# Patient Record
Sex: FEMALE | Race: WHITE | NOT HISPANIC OR LATINO | Employment: OTHER | URBAN - METROPOLITAN AREA
[De-identification: names, ages, dates, MRNs, and addresses within clinical notes are randomized per-mention and may not be internally consistent; named-entity substitution may affect disease eponyms.]

---

## 2021-05-12 ENCOUNTER — PATIENT MESSAGE (OUTPATIENT)
Dept: RESEARCH | Facility: HOSPITAL | Age: 81
End: 2021-05-12
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
## 2022-01-26 PROBLEM — S80.00XA CONTUSION OF KNEE: Status: ACTIVE | Noted: 2022-01-26

## 2022-01-26 PROBLEM — K21.00 GASTROESOPHAGEAL REFLUX DISEASE WITH ESOPHAGITIS WITHOUT HEMORRHAGE: Status: ACTIVE | Noted: 2022-01-26

## 2022-01-26 PROBLEM — Z96.653 HISTORY OF TOTAL BILATERAL KNEE REPLACEMENT: Status: ACTIVE | Noted: 2022-01-26

## 2022-01-26 PROBLEM — M25.561 ACUTE PAIN OF BOTH KNEES: Status: ACTIVE | Noted: 2022-01-26

## 2022-01-26 PROBLEM — M25.562 ACUTE PAIN OF BOTH KNEES: Status: ACTIVE | Noted: 2022-01-26

## 2022-01-26 PROBLEM — Z95.5 PRESENCE OF STENT IN CORONARY ARTERY: Status: ACTIVE | Noted: 2020-12-03

## 2022-01-26 PROBLEM — Z95.820 STATUS POST ANGIOPLASTY WITH STENT: Status: ACTIVE | Noted: 2020-12-03

## 2022-01-26 PROBLEM — E78.00 HIGH CHOLESTEROL: Status: ACTIVE | Noted: 2022-01-26

## 2022-01-26 PROBLEM — I10 HYPERTENSION, ESSENTIAL: Chronic | Status: ACTIVE | Noted: 2022-01-26

## 2024-04-16 ENCOUNTER — TELEPHONE (OUTPATIENT)
Dept: CARDIOLOGY | Facility: CLINIC | Age: 84
End: 2024-04-16
Payer: MEDICARE

## 2024-04-16 DIAGNOSIS — I34.2 NON-RHEUMATIC MITRAL VALVE STENOSIS: Primary | ICD-10-CM

## 2024-04-16 NOTE — TELEPHONE ENCOUNTER
Request for evaluation by Dr Contreras for angiogram. Spoke with nurse staff for clarification as patient has Mitral valve stenosis, Dr Cheng request Dr Contreras to evaluate patient for possible blockages prior to additional possible valve needs in the future pt would be then referred to Dr Chiu or Dr Angel.  Appointment made as requested.

## 2024-04-17 ENCOUNTER — TELEPHONE (OUTPATIENT)
Dept: CARDIOLOGY | Facility: CLINIC | Age: 84
End: 2024-04-17
Payer: MEDICARE

## 2024-04-17 NOTE — TELEPHONE ENCOUNTER
Contact with patient, offered virtrual appointment due to long distance travel.  Pt declined.  Pt bringing CD of angio done 3 years ago.  Pt continues to attempt to get her ECHO preformed with Dr Worthy in [place] but was told she could have the report but not the imaging. Pt is scheduled for ECHO at [facility] on day of her clinic appointment.

## 2024-04-25 ENCOUNTER — HOSPITAL ENCOUNTER (OUTPATIENT)
Dept: CARDIOLOGY | Facility: HOSPITAL | Age: 84
Discharge: HOME | End: 2024-04-25
Attending: INTERNAL MEDICINE
Payer: MEDICARE

## 2024-04-25 ENCOUNTER — EDUCATION (OUTPATIENT)
Dept: CARDIOLOGY | Facility: CLINIC | Age: 84
End: 2024-04-25
Payer: MEDICARE

## 2024-04-25 ENCOUNTER — OFFICE VISIT (OUTPATIENT)
Dept: CARDIOLOGY | Facility: CLINIC | Age: 84
End: 2024-04-25
Payer: MEDICARE

## 2024-04-25 VITALS
HEIGHT: 61 IN | HEART RATE: 71 BPM | WEIGHT: 147.06 LBS | BODY MASS INDEX: 27.77 KG/M2 | DIASTOLIC BLOOD PRESSURE: 84 MMHG | SYSTOLIC BLOOD PRESSURE: 187 MMHG | OXYGEN SATURATION: 100 %

## 2024-04-25 VITALS
DIASTOLIC BLOOD PRESSURE: 94 MMHG | BODY MASS INDEX: 39.01 KG/M2 | SYSTOLIC BLOOD PRESSURE: 157 MMHG | HEIGHT: 62 IN | HEART RATE: 70 BPM | WEIGHT: 212 LBS

## 2024-04-25 DIAGNOSIS — I10 HYPERTENSION, ESSENTIAL: Chronic | ICD-10-CM

## 2024-04-25 DIAGNOSIS — E66.01 SEVERE OBESITY (BMI 35.0-39.9) WITH COMORBIDITY: Primary | ICD-10-CM

## 2024-04-25 DIAGNOSIS — I34.2 NON-RHEUMATIC MITRAL VALVE STENOSIS: Primary | ICD-10-CM

## 2024-04-25 DIAGNOSIS — Z95.820 STATUS POST ANGIOPLASTY WITH STENT: ICD-10-CM

## 2024-04-25 DIAGNOSIS — I34.2 NON-RHEUMATIC MITRAL VALVE STENOSIS: ICD-10-CM

## 2024-04-25 DIAGNOSIS — I25.118 CORONARY ARTERY DISEASE OF NATIVE ARTERY OF NATIVE HEART WITH STABLE ANGINA PECTORIS: ICD-10-CM

## 2024-04-25 LAB
ASCENDING AORTA: 2.84 CM
AV INDEX (PROSTH): 0.61
AV MEAN GRADIENT: 7 MMHG
AV PEAK GRADIENT: 14 MMHG
AV VALVE AREA BY VELOCITY RATIO: 1.61 CM²
AV VALVE AREA: 1.74 CM²
AV VELOCITY RATIO: 0.57
BSA FOR ECHO PROCEDURE: 2.05 M2
CV ECHO LV RWT: 0.62 CM
DOP CALC AO PEAK VEL: 1.9 M/S
DOP CALC AO VTI: 44.27 CM
DOP CALC LVOT AREA: 2.8 CM2
DOP CALC LVOT DIAMETER: 1.9 CM
DOP CALC LVOT PEAK VEL: 1.08 M/S
DOP CALC LVOT STROKE VOLUME: 76.85 CM3
DOP CALC MV VTI: 83.18 CM
DOP CALCLVOT PEAK VEL VTI: 27.12 CM
E WAVE DECELERATION TIME: 359.24 MSEC
E/A RATIO: 1.63
E/E' RATIO: 42 M/S
ECHO LV POSTERIOR WALL: 1.21 CM (ref 0.6–1.1)
FRACTIONAL SHORTENING: 38 % (ref 28–44)
HR MV ECHO: 63 BPM
INTERVENTRICULAR SEPTUM: 0.91 CM (ref 0.6–1.1)
IVRT: 85.63 MSEC
LA MAJOR: 5.61 CM
LA MINOR: 5.51 CM
LA WIDTH: 4.13 CM
LEFT ATRIUM SIZE: 4.09 CM
LEFT ATRIUM VOLUME INDEX MOD: 37.9 ML/M2
LEFT ATRIUM VOLUME INDEX: 40.7 ML/M2
LEFT ATRIUM VOLUME MOD: 74.35 CM3
LEFT ATRIUM VOLUME: 79.82 CM3
LEFT INTERNAL DIMENSION IN SYSTOLE: 2.43 CM (ref 2.1–4)
LEFT VENTRICLE DIASTOLIC VOLUME INDEX: 33.54 ML/M2
LEFT VENTRICLE DIASTOLIC VOLUME: 65.74 ML
LEFT VENTRICLE MASS INDEX: 68 G/M2
LEFT VENTRICLE SYSTOLIC VOLUME INDEX: 10.7 ML/M2
LEFT VENTRICLE SYSTOLIC VOLUME: 20.88 ML
LEFT VENTRICULAR INTERNAL DIMENSION IN DIASTOLE: 3.9 CM (ref 3.5–6)
LEFT VENTRICULAR MASS: 132.76 G
LV LATERAL E/E' RATIO: 42 M/S
LV SEPTAL E/E' RATIO: 42 M/S
MV A" WAVE DURATION": 15.13 MSEC
MV MEAN GRADIENT: 11 MMHG
MV PEAK A VEL: 1.03 M/S
MV PEAK E VEL: 1.68 M/S
MV PEAK GRADIENT: 25 MMHG
MV STENOSIS PRESSURE HALF TIME: 104.18 MS
MV VALVE AREA BY CONTINUITY EQUATION: 0.92 CM2
MV VALVE AREA P 1/2 METHOD: 2.11 CM2
OHS CV RV/LV RATIO: 0.76 CM
PISA MRMAX VEL: 0.06 M/S
PISA TR MAX VEL: 3.22 M/S
PULM VEIN S/D RATIO: 1.63
PV PEAK D VEL: 0.24 M/S
PV PEAK S VEL: 0.39 M/S
RA MAJOR: 4.91 CM
RA PRESSURE ESTIMATED: 3 MMHG
RA WIDTH: 3.23 CM
RIGHT VENTRICULAR END-DIASTOLIC DIMENSION: 2.96 CM
RV TB RVSP: 6 MMHG
SINUS: 2.74 CM
STJ: 2.36 CM
TDI LATERAL: 0.04 M/S
TDI SEPTAL: 0.04 M/S
TDI: 0.04 M/S
TR MAX PG: 41 MMHG
TRICUSPID ANNULAR PLANE SYSTOLIC EXCURSION: 1.84 CM
TV REST PULMONARY ARTERY PRESSURE: 44 MMHG
Z-SCORE OF LEFT VENTRICULAR DIMENSION IN END DIASTOLE: -3.64
Z-SCORE OF LEFT VENTRICULAR DIMENSION IN END SYSTOLE: -2.77

## 2024-04-25 PROCEDURE — 99999 PR PBB SHADOW E&M-EST. PATIENT-LVL III: CPT | Mod: PBBFAC,,, | Performed by: INTERNAL MEDICINE

## 2024-04-25 PROCEDURE — 99204 OFFICE O/P NEW MOD 45 MIN: CPT | Mod: S$PBB,,, | Performed by: INTERNAL MEDICINE

## 2024-04-25 PROCEDURE — 93306 TTE W/DOPPLER COMPLETE: CPT

## 2024-04-25 PROCEDURE — 99213 OFFICE O/P EST LOW 20 MIN: CPT | Mod: PBBFAC,25 | Performed by: INTERNAL MEDICINE

## 2024-04-25 PROCEDURE — 93306 TTE W/DOPPLER COMPLETE: CPT | Mod: 26,,, | Performed by: INTERNAL MEDICINE

## 2024-04-25 RX ORDER — METOPROLOL TARTRATE 25 MG/1
25 TABLET, FILM COATED ORAL 2 TIMES DAILY
Qty: 60 TABLET | Refills: 3 | Status: SHIPPED | OUTPATIENT
Start: 2024-04-25 | End: 2025-04-25

## 2024-04-25 RX ORDER — IBUPROFEN 600 MG/1
TABLET ORAL
COMMUNITY
Start: 2024-01-01

## 2024-04-25 NOTE — PROGRESS NOTES
If you need to change the date of your procedure, please call  Juani SNELL [phone]  CALL THE OFFICE IF YOU HAVE ANY MEDICATIONS CHANGES BEFORE A PROCEDURE.     OUTPATIENT CATHETERIZATION INSTRUCTIONS - NEW PROCEDURE DATE AND ARRIVAL TIME.     You have been scheduled for a procedure in the catheterization lab on  WEDNESDAY, MAY 13, 2024     Please report to the Cardiology Waiting Area on the Third floor of the hospital and check in at ARRIVAL TIME at 0600.   You will then be taken to the SSCU (Short Stay Cardiac Unit) and prepared for your procedure. Please be aware that this is not the time of your procedure but the time you are to arrive. The procedures are scheduled on an hourly basis; however, emergency cases take precedence over all other cases.         1. NOTHING TO EAT AFTER MIDNIGHT 12AM the morning of the procedure.  You may take your medications with small sips of water. SEE BELOW INSTRUCTIONS ON MEDICATIONS.     2.  Continue with daily medications, including the morning of the procedure except for those instructed to stop or hold (diurectic).  Do not stop your Aspirin.  SEE BELOW.  Start new medicine, and take daily everyday.    3.  Diabetics:  IF TAKING OZEMPIC- THIS MUST BE HELD FOR ONE WEEK PRIOR TO PROCEDURE.    4. Heart Failure Patients:N/A        The procedure will take 1-2 hours to perform. After the procedure, you will return to SSCU on the third floor of the hospital. You will need to lie still (or keep your arm still) for the next 4 to 6 hours to minimize bleeding from the puncture site. Your family may remain in the room with you during this time.         You may be able to be discharged home that same afternoon if there is someone to drive you home and there were no complications. If you have one of the balloon, stent, or device procedures you may spend the night in the hospital. Your doctor will determine, based on your progress, the date and time of your discharge. The results of your  procedure will be discussed with you before you are discharged. Any further testing or procedures will be scheduled for you either before you leave or you will be called with these appointments.   YOU WILL NOT BE ABLE TO DRIVE HOME  THE DAY OF THE PROCEDURE.      If you should have any questions, concerns, or please call Juani [phone]        Special Instructions:  Drink plenty of water the day before the procedure and the day after the procedure to flush your kidneys.     Continue daily medications, including aspirin the morning of the procedure.  See Special instructions for other medications.     IMPORTANT:  HOLD The following medications as directed:    HOLD FLUID PILLS - MICROZIDE do not take morning of the procedure, but bring with you to hospital and make take it after the procedure.      DO NOT STOP ASPIRIN.  Take the morning of the procedure.             THE ABOVE INSTRUCTIONS WERE GIVEN TO THE PATIENT VERBALLY AND THEY VERBALIZED UNDERSTANDING.  THEY DO NOT REQUIRE ANY SPECIAL NEEDS AND DO NOT HAVE ANY LEARNING BARRIERS.          Directions for Reporting to Cardiology Waiting Area in the Hospital  If you park in the Parking Garage:  Take elevators to the1st floor of the parking garage.  Continue past the gift shop, coffee shop, and piano.  Take a right and go to the gold elevators. (Elevator B)  Take the elevator to the 3rd floor.  Follow the arrow on the sign on the wall that says Cath Lab Registration/EP Lab Registration.  Follow the long hallway all the way around until you come to a big open area.  This is the registration area.  Check in at Reception Desk.     OR     If family is dropping you off:  Have them drop you off at the front of the Hospital under the green overhang.  Enter through the doors and take a right.  Take the 'E' elevators to the 3rd floor Cardiology Waiting Area.  Check in at the Reception Desk in the waiting room.

## 2024-04-26 DIAGNOSIS — I25.10 ATHEROSCLEROSIS OF CORONARY ARTERY, UNSPECIFIED VESSEL OR LESION TYPE, UNSPECIFIED WHETHER ANGINA PRESENT, UNSPECIFIED WHETHER NATIVE OR TRANSPLANTED HEART: Primary | ICD-10-CM

## 2024-04-26 DIAGNOSIS — I25.10 CAD (CORONARY ARTERY DISEASE): ICD-10-CM

## 2024-04-26 RX ORDER — SODIUM CHLORIDE 9 MG/ML
INJECTION, SOLUTION INTRAVENOUS CONTINUOUS
Status: CANCELLED | OUTPATIENT
Start: 2024-04-26 | End: 2024-04-26

## 2024-04-26 RX ORDER — DIPHENHYDRAMINE HCL 50 MG
50 CAPSULE ORAL ONCE
Status: CANCELLED | OUTPATIENT
Start: 2024-04-26 | End: 2024-04-26

## 2024-04-27 PROBLEM — I25.118 CORONARY ARTERY DISEASE OF NATIVE ARTERY OF NATIVE HEART WITH STABLE ANGINA PECTORIS: Status: ACTIVE | Noted: 2024-04-27

## 2024-04-27 NOTE — PROGRESS NOTES
"Subjective:    Patient ID:  Gudelia Long is a 84 y.o. female who presents for evaluation of Coronary Artery Disease and Shortness of Breath      Referring physician: Michael Gaines     HPI  Mrs. Long is an 83 y/o woman with CAD and h/o coronary stents. She has THN and HLD. She also has scleroderma diagnosed 30 years ago.  She reports SOB X 6 months that has been progressive.  She reports similar symptoms prior to her coronary stent placement in 2020.  She denies LE edema, orthopnea, or PND.  She denies palpitations, syncope, or near syncope. However she reports 1 dizzy spell while walking several weeks ago that was transient.      Past Medical History:   Diagnosis Date    Contusion of RT & LT Knees Onset 18 January 2022 1/26/2022    Fall Onto Knees 18 January 2022 1/26/2022    Gastroesophageal reflux disease with esophagitis without hemorrhage 1/26/2022    High cholesterol 1/26/2022    History Total Knee Replacement:  LT TKR 2013 & RT TKR 04 January 2018 1/26/2022    Hypertension     Hypertension, essential 1/26/2022    RT & LT Knee Pain Onset 18 January 2022 1/26/2022    Status post Cardiac Angioplasty with Stent 03 Dec 2020 12/3/2020       Past Surgical History:   Procedure Laterality Date    CATARACT EXTRACTION W/ INTRAOCULAR LENS IMPLANT Bilateral 1992    [place], [place]    CORONARY ANGIOPLASTY WITH STENT PLACEMENT  12/03/2020    Dr Arm[facility]    HYSTERECTOMY  1996    [place], [place]    TOTAL KNEE ARTHROPLASTY Left 2013    [facility]    TOTAL KNEE ARTHROPLASTY Right 01/04/2018    RT TKR. " It never worked right." [facility]       Current Outpatient Medications on File Prior to Visit   Medication Sig Dispense Refill    amLODIPine (NORVASC) 5 MG tablet Take 5 mg by mouth once daily.      aspirin (ECOTRIN) 81 MG EC tablet Take 81 mg by mouth.      hydroCHLOROthiazide " (MICROZIDE) 12.5 mg capsule Take 12.5 mg by mouth.      ibuprofen (ADVIL,MOTRIN) 600 MG tablet       losartan (COZAAR) 100 MG tablet Take 100 mg by mouth once daily.      omeprazole (PRILOSEC) 40 MG capsule       atorvastatin (LIPITOR) 40 MG tablet Take 40 mg by mouth. (Patient not taking: Reported on 4/25/2024)       No current facility-administered medications on file prior to visit.       Review of patient's allergies indicates:  No Known Allergies    Social History     Tobacco Use    Smoking status: Never    Smokeless tobacco: Never   Substance Use Topics    Alcohol use: Never    Drug use: Never       No family history on file.      Review of Systems   Constitutional: Negative for diaphoresis, fever, malaise/fatigue and weight gain.   HENT:  Negative for congestion, hearing loss, nosebleeds and sore throat.    Eyes:  Negative for visual disturbance.   Cardiovascular:  Positive for dyspnea on exertion. Negative for chest pain, claudication, irregular heartbeat, leg swelling, near-syncope, orthopnea, palpitations, paroxysmal nocturnal dyspnea and syncope.   Respiratory:  Negative for cough, hemoptysis, shortness of breath and wheezing.    Endocrine: Negative for polyuria.   Hematologic/Lymphatic: Negative for bleeding problem. Does not bruise/bleed easily.   Skin:  Negative for poor wound healing and rash.   Musculoskeletal:  Negative for myalgias.   Gastrointestinal:  Negative for abdominal pain, change in bowel habit, constipation, diarrhea, dysphagia, heartburn, hematemesis, melena, nausea and vomiting.   Genitourinary:  Negative for bladder incontinence and dysuria.   Neurological:  Negative for focal weakness, headaches and weakness.   Psychiatric/Behavioral:  Negative for depression.    Allergic/Immunologic: Negative for hives and persistent infections.        Objective:     Vitals:    04/25/24 0925 04/25/24 0926   BP: (!) 202/91 (!) 187/84   BP Location: Left arm Right arm   Patient Position: Sitting  "Sitting   BP Method: Large (Automatic) Large (Automatic)   Pulse: 70 71   SpO2: 100% 100%   Weight: 66.7 kg (147 lb 0.8 oz)    Height: 5' 1" (1.549 m)         Physical Exam  Constitutional:       Appearance: She is well-developed. She is not diaphoretic.   HENT:      Head: Normocephalic and atraumatic.   Eyes:      General: No scleral icterus.        Right eye: No discharge.      Extraocular Movements: Extraocular movements intact.   Neck:      Thyroid: No thyromegaly.      Vascular: No JVD.   Cardiovascular:      Rate and Rhythm: Normal rate and regular rhythm.      Chest Wall: PMI is not displaced.      Pulses:           Carotid pulses are 2+ on the right side and 2+ on the left side.       Radial pulses are 2+ on the right side and 2+ on the left side.        Femoral pulses are 2+ on the right side and 2+ on the left side.       Dorsalis pedis pulses are 2+ on the right side and 2+ on the left side.        Posterior tibial pulses are 2+ on the right side and 2+ on the left side.      Heart sounds: No murmur heard.     No gallop.   Pulmonary:      Effort: Pulmonary effort is normal.      Breath sounds: Normal breath sounds.   Abdominal:      General: Bowel sounds are normal.      Palpations: Abdomen is soft.      Tenderness: There is no abdominal tenderness.   Lymphadenopathy:      Cervical: No cervical adenopathy.   Skin:     General: Skin is warm and dry.   Neurological:      Mental Status: She is alert.      Gait: Gait normal.   Psychiatric:         Mood and Affect: Mood normal.         Behavior: Behavior normal.           Assessment:       1. Severe obesity (BMI 35.0-39.9) with comorbidity    2. Hypertension, essential    3. Status post Cardiac Angioplasty with Stent 03 Dec 2020    4. Coronary artery disease of native artery of native heart with stable angina pectoris         Plan:       1) RIVERA.  The patient has CAD and her RIVERA was her anginal equivalent in the past; it has now recurred; I discussed medical " management plus cardiac catheterization and possible PCI with the patient vs medical management alone.  She opted for medical management plus cardiac catherization and possible PCI.  -continue EC ASA 81mg po q day  -start Plavix 75mg poq day  -start lopressor 25mg po BID  The risks, benefits, and alternatives of cardiac catheterization and possible intervention were discussed with the patient.  All questions were answered and informed consent was obtained.    2) HTN. The patient reports well controlled home blood pressure of 130's/80's and states she has white coat HTN; rec blood pressure log X 1 weeks  -continue losartan 100mg po qday  -continue Norvasc 5mg po q day    3) HLD. Continue Lipitor 40mgpo q day    4) Scleroderma. Rec close follow-up with patient's rheumatologist    All of the patient's and her family's questions were answered.

## 2024-04-29 ENCOUNTER — TELEPHONE (OUTPATIENT)
Dept: CARDIOLOGY | Facility: CLINIC | Age: 84
End: 2024-04-29
Payer: MEDICARE

## 2024-04-29 NOTE — TELEPHONE ENCOUNTER
Attempt to contact patient x 2, no answer, secondary number non working.  Left detailed message to call office back.  Provided office phone number and message for patient to start Plavix 75mg one tablet daily everyday in anticipation for the angiogram planned.

## 2024-04-29 NOTE — TELEPHONE ENCOUNTER
Pharmacy contact call - NEW RX -  verbal given to pharmacy - plavix 75mg one tablet once daily #30, 3 refills.  Pt to begin taking daily and including the morning of the procedure along with aspirin 81mg one tablet daily.  Notification to patient of Rx and treatment med plan added.

## 2024-05-01 ENCOUNTER — TELEPHONE (OUTPATIENT)
Dept: CARDIOLOGY | Facility: CLINIC | Age: 84
End: 2024-05-01
Payer: MEDICARE

## 2024-05-13 ENCOUNTER — TELEPHONE (OUTPATIENT)
Dept: CARDIAC REHAB | Facility: CLINIC | Age: 84
End: 2024-05-13
Payer: MEDICARE

## 2024-05-13 ENCOUNTER — HOSPITAL ENCOUNTER (OUTPATIENT)
Facility: HOSPITAL | Age: 84
Discharge: HOME | End: 2024-05-13
Attending: INTERNAL MEDICINE | Admitting: INTERNAL MEDICINE
Payer: MEDICARE

## 2024-05-13 VITALS
RESPIRATION RATE: 18 BRPM | OXYGEN SATURATION: 97 % | BODY MASS INDEX: 27.38 KG/M2 | HEIGHT: 61 IN | WEIGHT: 145 LBS | TEMPERATURE: 98 F | SYSTOLIC BLOOD PRESSURE: 147 MMHG | DIASTOLIC BLOOD PRESSURE: 70 MMHG | HEART RATE: 65 BPM

## 2024-05-13 DIAGNOSIS — Z95.820 S/P PERCUTANEOUS TRANSLUMINAL ANGIOPLASTY (PTA) WITH STENT PLACEMENT: Primary | ICD-10-CM

## 2024-05-13 DIAGNOSIS — I25.10 CAD (CORONARY ARTERY DISEASE): ICD-10-CM

## 2024-05-13 DIAGNOSIS — I25.10 ATHEROSCLEROSIS OF CORONARY ARTERY, UNSPECIFIED VESSEL OR LESION TYPE, UNSPECIFIED WHETHER ANGINA PRESENT, UNSPECIFIED WHETHER NATIVE OR TRANSPLANTED HEART: ICD-10-CM

## 2024-05-13 DIAGNOSIS — I25.10 CORONARY ARTERY DISEASE: ICD-10-CM

## 2024-05-13 LAB
ABO + RH BLD: NORMAL
ANION GAP SERPL CALC-SCNC: 10 MMOL/L (ref 8–16)
BASOPHILS # BLD AUTO: 0.03 K/UL (ref 0–0.2)
BASOPHILS NFR BLD: 0.5 % (ref 0–1.9)
BLD GP AB SCN CELLS X3 SERPL QL: NORMAL
BUN SERPL-MCNC: 29 MG/DL (ref 8–23)
CALCIUM SERPL-MCNC: 9.7 MG/DL (ref 8.7–10.5)
CHLORIDE SERPL-SCNC: 106 MMOL/L (ref 95–110)
CO2 SERPL-SCNC: 24 MMOL/L (ref 23–29)
CREAT SERPL-MCNC: 0.8 MG/DL (ref 0.5–1.4)
DIFFERENTIAL METHOD BLD: ABNORMAL
EOSINOPHIL # BLD AUTO: 0.3 K/UL (ref 0–0.5)
EOSINOPHIL NFR BLD: 5.2 % (ref 0–8)
ERYTHROCYTE [DISTWIDTH] IN BLOOD BY AUTOMATED COUNT: 13.4 % (ref 11.5–14.5)
EST. GFR  (NO RACE VARIABLE): >60 ML/MIN/1.73 M^2
GLUCOSE SERPL-MCNC: 89 MG/DL (ref 70–110)
HCT VFR BLD AUTO: 40.2 % (ref 37–48.5)
HGB BLD-MCNC: 13.5 G/DL (ref 12–16)
IMM GRANULOCYTES # BLD AUTO: 0.03 K/UL (ref 0–0.04)
IMM GRANULOCYTES NFR BLD AUTO: 0.5 % (ref 0–0.5)
LYMPHOCYTES # BLD AUTO: 1.3 K/UL (ref 1–4.8)
LYMPHOCYTES NFR BLD: 20.2 % (ref 18–48)
MCH RBC QN AUTO: 32.1 PG (ref 27–31)
MCHC RBC AUTO-ENTMCNC: 33.6 G/DL (ref 32–36)
MCV RBC AUTO: 96 FL (ref 82–98)
MONOCYTES # BLD AUTO: 0.8 K/UL (ref 0.3–1)
MONOCYTES NFR BLD: 12.3 % (ref 4–15)
NEUTROPHILS # BLD AUTO: 4.1 K/UL (ref 1.8–7.7)
NEUTROPHILS NFR BLD: 61.3 % (ref 38–73)
NRBC BLD-RTO: 0 /100 WBC
OHS QRS DURATION: 72 MS
OHS QRS DURATION: 74 MS
OHS QTC CALCULATION: 445 MS
OHS QTC CALCULATION: 455 MS
PLATELET # BLD AUTO: 169 K/UL (ref 150–450)
PMV BLD AUTO: 11.7 FL (ref 9.2–12.9)
POTASSIUM SERPL-SCNC: 3.6 MMOL/L (ref 3.5–5.1)
RBC # BLD AUTO: 4.21 M/UL (ref 4–5.4)
SODIUM SERPL-SCNC: 140 MMOL/L (ref 136–145)
SPECIMEN OUTDATE: NORMAL
WBC # BLD AUTO: 6.6 K/UL (ref 3.9–12.7)

## 2024-05-13 PROCEDURE — C1725 CATH, TRANSLUMIN NON-LASER: HCPCS | Performed by: INTERNAL MEDICINE

## 2024-05-13 PROCEDURE — 25000003 PHARM REV CODE 250: Performed by: INTERNAL MEDICINE

## 2024-05-13 PROCEDURE — 99152 MOD SED SAME PHYS/QHP 5/>YRS: CPT | Mod: GC,,, | Performed by: INTERNAL MEDICINE

## 2024-05-13 PROCEDURE — 93454 CORONARY ARTERY ANGIO S&I: CPT | Performed by: INTERNAL MEDICINE

## 2024-05-13 PROCEDURE — 63600175 PHARM REV CODE 636 W HCPCS: Performed by: INTERNAL MEDICINE

## 2024-05-13 PROCEDURE — C1894 INTRO/SHEATH, NON-LASER: HCPCS | Performed by: INTERNAL MEDICINE

## 2024-05-13 PROCEDURE — 99152 MOD SED SAME PHYS/QHP 5/>YRS: CPT | Performed by: INTERNAL MEDICINE

## 2024-05-13 PROCEDURE — 93010 ELECTROCARDIOGRAM REPORT: CPT | Mod: ,,, | Performed by: INTERNAL MEDICINE

## 2024-05-13 PROCEDURE — C1874 STENT, COATED/COV W/DEL SYS: HCPCS | Performed by: INTERNAL MEDICINE

## 2024-05-13 PROCEDURE — 92978 ENDOLUMINL IVUS OCT C 1ST: CPT | Performed by: INTERNAL MEDICINE

## 2024-05-13 PROCEDURE — 85347 COAGULATION TIME ACTIVATED: CPT | Performed by: INTERNAL MEDICINE

## 2024-05-13 PROCEDURE — 99153 MOD SED SAME PHYS/QHP EA: CPT | Performed by: INTERNAL MEDICINE

## 2024-05-13 PROCEDURE — 25500020 PHARM REV CODE 255: Performed by: INTERNAL MEDICINE

## 2024-05-13 PROCEDURE — 93454 CORONARY ARTERY ANGIO S&I: CPT | Mod: 26,51,XS,GC | Performed by: INTERNAL MEDICINE

## 2024-05-13 PROCEDURE — C1753 CATH, INTRAVAS ULTRASOUND: HCPCS | Performed by: INTERNAL MEDICINE

## 2024-05-13 PROCEDURE — 36415 COLL VENOUS BLD VENIPUNCTURE: CPT | Performed by: INTERNAL MEDICINE

## 2024-05-13 PROCEDURE — 92978 ENDOLUMINL IVUS OCT C 1ST: CPT | Mod: 26,GC,, | Performed by: INTERNAL MEDICINE

## 2024-05-13 PROCEDURE — 92928 PRQ TCAT PLMT NTRAC ST 1 LES: CPT | Mod: LC,GC,, | Performed by: INTERNAL MEDICINE

## 2024-05-13 PROCEDURE — C1769 GUIDE WIRE: HCPCS | Performed by: INTERNAL MEDICINE

## 2024-05-13 PROCEDURE — C9600 PERC DRUG-EL COR STENT SING: HCPCS | Mod: LC | Performed by: INTERNAL MEDICINE

## 2024-05-13 PROCEDURE — 85025 COMPLETE CBC W/AUTO DIFF WBC: CPT | Performed by: INTERNAL MEDICINE

## 2024-05-13 PROCEDURE — 86850 RBC ANTIBODY SCREEN: CPT | Performed by: INTERNAL MEDICINE

## 2024-05-13 PROCEDURE — 27201423 OPTIME MED/SURG SUP & DEVICES STERILE SUPPLY: Performed by: INTERNAL MEDICINE

## 2024-05-13 PROCEDURE — 80048 BASIC METABOLIC PNL TOTAL CA: CPT | Performed by: INTERNAL MEDICINE

## 2024-05-13 PROCEDURE — C1887 CATHETER, GUIDING: HCPCS | Performed by: INTERNAL MEDICINE

## 2024-05-13 PROCEDURE — 93005 ELECTROCARDIOGRAM TRACING: CPT

## 2024-05-13 DEVICE — EVEROLIMUS-ELUTING PLATINUM CHROMIUM CORONARY STENT SYSTEM
Type: IMPLANTABLE DEVICE | Site: CORONARY | Status: FUNCTIONAL
Brand: SYNERGY™ XD

## 2024-05-13 RX ORDER — MIDAZOLAM HYDROCHLORIDE 1 MG/ML
INJECTION, SOLUTION INTRAMUSCULAR; INTRAVENOUS
Status: DISCONTINUED | OUTPATIENT
Start: 2024-05-13 | End: 2024-05-13

## 2024-05-13 RX ORDER — HEPARIN SODIUM 1000 [USP'U]/ML
INJECTION, SOLUTION INTRAVENOUS; SUBCUTANEOUS
Status: DISCONTINUED | OUTPATIENT
Start: 2024-05-13 | End: 2024-05-13

## 2024-05-13 RX ORDER — NITROGLYCERIN 0.4 MG/1
0.4 TABLET SUBLINGUAL EVERY 5 MIN PRN
COMMUNITY
Start: 2024-04-15

## 2024-05-13 RX ORDER — HEPARIN SOD,PORCINE/0.9 % NACL 1000/500ML
INTRAVENOUS SOLUTION INTRAVENOUS
Status: DISCONTINUED | OUTPATIENT
Start: 2024-05-13 | End: 2024-05-13

## 2024-05-13 RX ORDER — DIPHENHYDRAMINE HCL 50 MG
50 CAPSULE ORAL ONCE
Status: COMPLETED | OUTPATIENT
Start: 2024-05-13 | End: 2024-05-13

## 2024-05-13 RX ORDER — CLOPIDOGREL BISULFATE 75 MG/1
75 TABLET ORAL DAILY
COMMUNITY
Start: 2024-04-29

## 2024-05-13 RX ORDER — SODIUM CHLORIDE 9 MG/ML
INJECTION, SOLUTION INTRAVENOUS CONTINUOUS
Status: ACTIVE | OUTPATIENT
Start: 2024-05-13 | End: 2024-05-13

## 2024-05-13 RX ORDER — ACETAMINOPHEN 325 MG/1
650 TABLET ORAL EVERY 4 HOURS PRN
Status: DISCONTINUED | OUTPATIENT
Start: 2024-05-13 | End: 2024-05-13 | Stop reason: HOSPADM

## 2024-05-13 RX ORDER — FENTANYL CITRATE 50 UG/ML
INJECTION, SOLUTION INTRAMUSCULAR; INTRAVENOUS
Status: DISCONTINUED | OUTPATIENT
Start: 2024-05-13 | End: 2024-05-13

## 2024-05-13 RX ORDER — SODIUM CHLORIDE 9 MG/ML
INJECTION, SOLUTION INTRAVENOUS CONTINUOUS
Status: DISCONTINUED | OUTPATIENT
Start: 2024-05-13 | End: 2024-05-13

## 2024-05-13 RX ORDER — ONDANSETRON 8 MG/1
8 TABLET, ORALLY DISINTEGRATING ORAL EVERY 8 HOURS PRN
Status: DISCONTINUED | OUTPATIENT
Start: 2024-05-13 | End: 2024-05-13 | Stop reason: HOSPADM

## 2024-05-13 RX ORDER — ROSUVASTATIN CALCIUM 40 MG/1
40 TABLET, COATED ORAL DAILY
COMMUNITY
Start: 2024-04-29

## 2024-05-13 RX ORDER — LIDOCAINE HYDROCHLORIDE 20 MG/ML
INJECTION, SOLUTION EPIDURAL; INFILTRATION; INTRACAUDAL; PERINEURAL
Status: DISCONTINUED | OUTPATIENT
Start: 2024-05-13 | End: 2024-05-13

## 2024-05-13 RX ORDER — MULTIVITAMIN
1 TABLET ORAL EVERY MORNING
COMMUNITY

## 2024-05-13 RX ORDER — CHOLECALCIFEROL (VITAMIN D3) 25 MCG
2 TABLET ORAL EVERY MORNING
COMMUNITY

## 2024-05-13 RX ADMIN — DIPHENHYDRAMINE HYDROCHLORIDE 50 MG: 50 CAPSULE ORAL at 06:05

## 2024-05-13 RX ADMIN — SODIUM CHLORIDE: 9 INJECTION, SOLUTION INTRAVENOUS at 06:05

## 2024-05-13 NOTE — H&P
Interventional Cardiology H&P Note  Attending Physician: Jj Contreras MD     HPI:     Mrs. Long is an 85 y/o woman with known CAD and prior coronary stents, HTN, HLD, and scleroderma diagnosed 30 years ago. She reports SOB X 6 months that has been progressive and prompted clinic visit with Dr. Contreras. She reports similar symptoms prior to her coronary stent placement in 2020. She denies LE edema, orthopnea, or PND. She denies palpitations, syncope, or near syncope. However, she reports 1 dizzy spell while walking several weeks ago that was transient.     Denies chest pain with episodes and no symptoms at rest.    ROS:    Constitution: Negative for fever, chills, weight loss or gain.   HENT: Negative for sore throat, rhinorrhea, or headache.  Eyes: Negative for blurred or double vision.   Cardiovascular: See above  Pulmonary: Positive for SOB/RIVERA   Gastrointestinal: Negative for abdominal pain, nausea, vomiting, or diarrhea.   : Negative for dysuria.   Neurological: Negative for focal weakness or sensory changes.  PMH:     Past Medical History:   Diagnosis Date    Contusion of RT & LT Knees Onset 18 January 2022 01/26/2022    Fall Onto Knees 18 January 2022 01/26/2022    Gastroesophageal reflux disease with esophagitis without hemorrhage 01/26/2022    High cholesterol 01/26/2022    History Total Knee Replacement:  LT TKR 2013 & RT TKR 04 January 2018 01/26/2022    Hypertension     Hypertension, essential 01/26/2022    RT & LT Knee Pain Onset 18 January 2022 01/26/2022    Scleroderma     Status post Cardiac Angioplasty with Stent 03 Dec 2020 12/03/2020     Past Surgical History:   Procedure Laterality Date    CATARACT EXTRACTION W/ INTRAOCULAR LENS IMPLANT Bilateral 1992    [place], [place]    CORONARY ANGIOPLASTY WITH STENT PLACEMENT  12/03/2020    Dr [facility]ani W[facility] [place] [place]    HYSTERECTOMY  1996    [place], [place]    TOTAL KNEE ARTHROPLASTY Left 2013  "   LT TKR. Dr Juan Antonio Phillip [facility]    TOTAL KNEE ARTHROPLASTY Right 01/04/2018    RT TKR. " It never worked right." Dr Juan Antonio Phillip [facility]     Allergies:   Review of patient's allergies indicates:  No Known Allergies  Medications:     No current facility-administered medications on file prior to encounter.     Current Outpatient Medications on File Prior to Encounter   Medication Sig Dispense Refill    amLODIPine (NORVASC) 5 MG tablet Take 5 mg by mouth once daily.      aspirin (ECOTRIN) 81 MG EC tablet Take 81 mg by mouth.      clopidogreL (PLAVIX) 75 mg tablet Take 75 mg by mouth once daily.      hydroCHLOROthiazide (MICROZIDE) 12.5 mg capsule Take 12.5 mg by mouth.      ibuprofen (ADVIL,MOTRIN) 600 MG tablet       losartan (COZAAR) 100 MG tablet Take 100 mg by mouth once daily.      metoprolol tartrate (LOPRESSOR) 25 MG tablet Take 1 tablet (25 mg total) by mouth 2 (two) times daily. 60 tablet 3    multivitamin with folic acid 400 mcg Tab Take 1 tablet by mouth every morning.      omeprazole (PRILOSEC) 40 MG capsule       rosuvastatin (CRESTOR) 40 MG Tab Take 40 mg by mouth once daily.      vitamin D (VITAMIN D3) 1000 units Tab Take 2 tablets by mouth every morning.      atorvastatin (LIPITOR) 40 MG tablet Take 40 mg by mouth.      nitroGLYCERIN (NITROSTAT) 0.4 MG SL tablet Place 0.4 mg under the tongue every 5 (five) minutes as needed.       Social History:     Social History     Tobacco Use    Smoking status: Never    Smokeless tobacco: Never   Substance Use Topics    Alcohol use: Never     Physical Exam:     Vitals:  Temp:  [98.1 °F (36.7 °C)]   Pulse:  [58]   Resp:  [18]   BP: (158-175)/(71-79)   SpO2:  [99 %]  I/O's:  No intake or output data in the 24 hours ending 05/13/24 0708       Physical Exam  Constitutional:       Appearance: She is well-developed. She is not diaphoretic.   HENT:      Head: Normocephalic and atraumatic.   Eyes:      General: No scleral icterus.   " "     Right eye: No discharge.      Extraocular Movements: Extraocular movements intact.   Neck:      Thyroid: No thyromegaly.      Vascular: No JVD.   Cardiovascular:      Rate and Rhythm: Normal rate and regular rhythm.      Chest Wall: PMI is not displaced.      Pulses:           Carotid pulses are 2+ on the right side and 2+ on the left side.       Radial pulses are 2+ on the right side and 2+ on the left side.        Femoral pulses are 2+ on the right side and 2+ on the left side.       Dorsalis pedis pulses are 2+ on the right side and 2+ on the left side.        Posterior tibial pulses are 2+ on the right side and 2+ on the left side.      Heart sounds: No murmur heard.     No gallop.   Pulmonary:      Effort: Pulmonary effort is normal.      Breath sounds: Normal breath sounds.   Abdominal:      General: Bowel sounds are normal.      Palpations: Abdomen is soft.      Tenderness: There is no abdominal tenderness.   Lymphadenopathy:      Cervical: No cervical adenopathy.   Skin:     General: Skin is warm and dry.   Neurological:      Mental Status: She is alert.      Gait: Gait normal.   Psychiatric:         Mood and Affect: Mood normal.         Behavior: Behavior normal.   Labs:     No results for input(s): "NA", "K", "CL", "CO2", "BUN", "CREATININE", "GLUCOSE", "ANIONGAP" in the last 168 hours.  No results for input(s): "AST", "ALT", "ALKPHOS", "BILITOT", "BILIDIR", "ALBUMIN" in the last 168 hours.  No results for input(s): "TROPONINI", "BNP" in the last 168 hours. No results for input(s): "WBC", "HGB", "HCT", "PLT", "GRAN" in the last 168 hours.  No results for input(s): "PTT", "INR" in the last 168 hours.  Lab Results   Component Value Date    CHOL 170 12/01/2020    HDL 41 (L) 12/01/2020    LDLCALC 82 12/01/2020    TRIG 237 (H) 12/01/2020     No results found for: "HGBA1C"       Cardiovascular Imaging:   Echo 4/25/24:    Left Ventricle: The left ventricle is normal in size. Mildly increased wall thickness. " There is concentric remodeling. There is normal systolic function with a visually estimated ejection fraction of 55 - 60%. There is diastolic dysfunction.    Right Ventricle: Normal right ventricular cavity size. Systolic function is normal.    Left Atrium: Left atrium is severely dilated.    Right Atrium: Right atrium is severely dilated.    Aortic Valve: There is moderate aortic valve sclerosis. There is mild annular calcification present. Moderately restricted motion. There is mild stenosis. Aortic valve area by VTI is 1.74 cm². Aortic valve peak velocity is 1.90 m/s. Mean gradient is 7 mmHg. The dimensionless index is 0.61.    Mitral Valve: Mildly thickened leaflets. There is moderate annular dilation present. There is severe posterior mitral annular calcification present. There is severe stenosis. The mean pressure gradient across the mitral valve is 11 mmHg at a heart rate of 63 bpm. Valve area 1.1 by planimetry but no 3D performed, valve area 1.1 by PHT (but less accurate with calcific MS).    Pulmonary Artery: The estimated pulmonary artery systolic pressure is 44 mmHg.    IVC/SVC: Normal venous pressure at 3 mmHg.    Pericardium: There is a small circumferential effusion.    Mitral annular calcification extending approximately 270 degree around mitral valve, with some restriction of the anterior leaflet.    [facility] 12/3/2020 (Dr. Worthy)  Summary and diagnoses   1. Two-vessel significant flow-limiting epicardial coronary artery   disease.   2. Normal left ventricular systolic function with an estimated left   ventricular ejection fraction of 70 percent.   3. Successful percutaneous coronary intervention stent to the calcified   mid LAD 80% stenosis with 0 residual stenosis.   4. Successful percutaneous coronary intervention stent to the diffusely   diseased calcified mid RCA 80% stenosis with 0 residual stenosis.     Assessment & Plan:   1) RIVERA.  The patient has CAD and her RIVERA was her anginal equivalent in the  past; it has now recurred; I discussed medical management plus cardiac catheterization and possible PCI with the patient vs medical management alone. She opted for medical management plus cardiac catherization and possible PCI.  -continue EC ASA 81mg daily  -continue Plavix 75mg daily  -continue lopressor 25mg po BID    - Plan is for LHC/angiogram +/- PCI.   - TTE: 55-60%  - Anti-platelet therapy: ASA and plavix  - Access: R radial  - Catheters: Jonathon  - Creatinine/CrCl: 0.8  - Hemoglobin: 13.5  - Allergies: no contrast allergies.   - Pre-op hydration: 3 cc/kg/hr x 1 hour  - Pre-op med: 50 mg Benadryl    All patient's questions were answered.  The risks, benefits and alternatives of the procedure were explained to the patient.   The risks of coronary angiography include but are not limited to: bleeding, infection, heart rhythm abnormalities, allergic reactions, kidney injury and potential need for dialysis, stroke and death.   Should stenting be indicated, the patient has agreed to dual anti-platelet therapy for 1-consecutive year with a drug-eluting stent and a minimum of 1-month with the use of a bare metal stent  Additionally, pt is aware that non-compliance is likely to result in stent clotting with heart attack, heart failure, and/or death  The risks of moderate sedation include hypotension, respiratory depression, arrhythmias, bronchospasm, and death.   Informed consent was obtained and the  patient is agreeable to proceed with the procedure.     Signed:  Zi Russell MD  Cardiovascular Disease PGY-V  [facility]

## 2024-05-13 NOTE — BRIEF OP NOTE
Brief Operative Note:    : Jj Contreras MD     Referring Physician: Jj Contreras     All Operators: Surgeon(s):  Jj Contreras, Romain Sen MD Maitas, Oscar, MD Pantlin, Zi AGUIRRE MD     Preoperative Diagnosis: Atherosclerosis of coronary artery, unspecified vessel or lesion type, unspecified whether angina present, unspecified whether native or transplanted heart [I25.10]     Postop Diagnosis: Atherosclerosis of coronary artery, unspecified vessel or lesion type, unspecified whether angina present, unspecified whether native or transplanted heart [I25.10]    Treatments/Procedures: Procedure(s):  Stent, Drug Eluting, Single Vessel, Coronary  IVUS, Coronary    Access: Right radial artery    Findings: coronary disease is present.   Proximal LCx lesion   Patent LAD stent  Patent RCA stent  Significant MAC noted on fluoro.    Intervention: Successful PCI proximal LCx with 3x16mm MEÑO.    See catheterization report for full details.    Closure device: vasc band     Plan:  - Post cath protocol   - IVF @ 100 cc/kg/hr x 4 hours  - Bed rest x 2 hours   - Continue aspirin 81 mg daily indefinitely  - Continue plavix for minimum 6 months, ideally up to 1 year  - Continue high intensity statin therapy (LDL goal < 70)  - Risk factor reduction (BP <130/80 mmHg, glycemic control, etc)  - Resume cardiac diet  - Maximize medical management.  - Weight loss.  - Monitor site access for bleeding and/or hematoma. Please call stat for any access issues.  - Cardiac rehab referral  - Follow up with outpatient cardiologist Dr. Worthy in [place].    Estimated Blood loss: 20 cc    Specimens removed: No    Zi Russell MD  Cardiovascular Disease PGY-V  [facility]

## 2024-05-13 NOTE — PLAN OF CARE
Patient arrived to room. PIV placed, labs sent. Admit assessment completed. Plan of care discussed with patient.

## 2024-05-13 NOTE — DISCHARGE SUMMARY
Interventional Cardiology  Discharge Summary      Admit Date: 5/13/2024  Discharge Date:  5/13/2024  Attending Physician: Jj Contreras MD  Discharge Physician: Zi Russell MD    Principal Diagnoses: <principal problem not specified>  Indication for Admission: Stent, Drug Eluting, Single Vessel, Coronary, IVUS, Coronary    Discharged Condition: Good    Hospital Course:   Patient presented for outpatient coronary angiogram which went without complication. Coronary angiogram revealed proximal LCx disease s/p PCI. Patent RCA and LAD stents . See full cath report in Epic for details. Hemostasis of patient's R Radial access site was achieved with VascBand. Patient was monitored per post-cath protocol, and her R radial access site was c/d/i with no hematoma. Patient was able to ambulate without difficulty, was feeling well, and anticipating discharge home today.     Outpatient Plan:  - There were no medication changes. Continue aspirin and plavix    Diet: Cardiac diet    Activity: Ad tim, wound care instructions provided    Disposition: Home or Self Care    Follow Up:  Dr. Worthy in [place]    Discharge Medications:      Medication List        ASK your doctor about these medications      amLODIPine 5 MG tablet  Commonly known as: NORVASC     aspirin 81 MG EC tablet  Commonly known as: ECOTRIN     atorvastatin 40 MG tablet  Commonly known as: LIPITOR     clopidogreL 75 mg tablet  Commonly known as: PLAVIX     hydroCHLOROthiazide 12.5 mg capsule  Commonly known as: MICROZIDE     ibuprofen 600 MG tablet  Commonly known as: ADVIL,MOTRIN     losartan 100 MG tablet  Commonly known as: COZAAR     metoprolol tartrate 25 MG tablet  Commonly known as: LOPRESSOR  Take 1 tablet (25 mg total) by mouth 2 (two) times daily.     multivitamin with folic acid 400 mcg Tab     nitroGLYCERIN 0.4 MG SL tablet  Commonly known as: NITROSTAT     omeprazole 40 MG capsule  Commonly known as: PRILOSEC     rosuvastatin 40 MG Tab  Commonly  known as: CRESTOR     vitamin D 1000 units Tab  Commonly known as: VITAMIN D3              Zi Russell MD  Cardiovascular Disease PGY-V  [facility]

## 2024-05-13 NOTE — TELEPHONE ENCOUNTER
Information letter mailed to patient as she lives out of the service area. Patient lives in [place]; contact sent for local CR to her areal.    Emory Westbrook RN  Cardiac Rehab Nurse

## 2024-05-13 NOTE — PLAN OF CARE
Received report from Faina. Patient s/p [facility], AAOx3. VSS, no c/o pain or discomfort at this time, resp even and unlabored. Vascband dressing to R wrist is CDI. No active bleeding. No hematoma noted. Post procedure protocol reviewed with patient and patient's family. Understanding verbalized. Family members at bedside. Nurse call bell within reach.

## 2024-05-14 ENCOUNTER — TELEPHONE (OUTPATIENT)
Dept: CARDIOLOGY | Facility: CLINIC | Age: 84
End: 2024-05-14
Payer: MEDICARE

## 2024-05-16 LAB
POC ACTIVATED CLOTTING TIME K: 228 SEC (ref 74–137)
POC ACTIVATED CLOTTING TIME K: 250 SEC (ref 74–137)
SAMPLE: ABNORMAL
SAMPLE: ABNORMAL

## 2024-08-12 ENCOUNTER — TELEPHONE (OUTPATIENT)
Dept: CARDIOLOGY | Facility: CLINIC | Age: 84
End: 2024-08-12
Payer: MEDICARE

## 2024-08-12 NOTE — TELEPHONE ENCOUNTER
Follow up with patient, attempt to reach via phone, no answer, left detailed message.  November appointment for follow up will be cancelled and continue follow up with cardiologist Dr Worthy in [place]. Pt is s/p stent in April 2024.  Provided phone number if patient would like to call office back.

## 2025-04-24 ENCOUNTER — TELEPHONE (OUTPATIENT)
Dept: CARDIOTHORACIC SURGERY | Facility: CLINIC | Age: 85
End: 2025-04-24
Payer: MEDICARE

## 2025-04-24 NOTE — TELEPHONE ENCOUNTER
Attempted return call to pt. No answer, left VM with request for call back and provided my direct number.       ----- Message from Pamela sent at 4/24/2025  3:24 PM CDT -----  Regarding: Scheduling Request  Name Of Caller:   Anu Preference:   [phone] of call:   Pt would like to schedule Np appt with Dr. Herrmann. She has some questions and would like a call back.

## 2025-04-28 ENCOUNTER — TELEPHONE (OUTPATIENT)
Dept: CARDIOTHORACIC SURGERY | Facility: CLINIC | Age: 85
End: 2025-04-28
Payer: MEDICARE

## 2025-04-28 NOTE — TELEPHONE ENCOUNTER
Received return call from pt and scheduled her for consultation appointment on [facility] [address] per her request. Pt verbalized understanding of appointment date, time, and location.

## 2025-04-28 NOTE — TELEPHONE ENCOUNTER
Attempted return call to pt. No answer, left VM with request for call back and provided my direct number.

## 2025-06-02 ENCOUNTER — TELEPHONE (OUTPATIENT)
Dept: CARDIOTHORACIC SURGERY | Facility: CLINIC | Age: 85
End: 2025-06-02
Payer: MEDICARE

## 2025-06-03 ENCOUNTER — OFFICE VISIT (OUTPATIENT)
Dept: CARDIOTHORACIC SURGERY | Facility: CLINIC | Age: 85
End: 2025-06-03
Payer: MEDICARE

## 2025-06-03 VITALS
DIASTOLIC BLOOD PRESSURE: 95 MMHG | HEART RATE: 61 BPM | BODY MASS INDEX: 28.35 KG/M2 | HEIGHT: 60 IN | SYSTOLIC BLOOD PRESSURE: 177 MMHG | WEIGHT: 144.38 LBS | OXYGEN SATURATION: 98 %

## 2025-06-03 DIAGNOSIS — I05.0 MITRAL VALVE STENOSIS, UNSPECIFIED ETIOLOGY: Primary | ICD-10-CM

## 2025-06-03 PROCEDURE — 99214 OFFICE O/P EST MOD 30 MIN: CPT | Mod: PBBFAC | Performed by: THORACIC SURGERY (CARDIOTHORACIC VASCULAR SURGERY)

## 2025-06-03 PROCEDURE — 99203 OFFICE O/P NEW LOW 30 MIN: CPT | Mod: S$PBB,,, | Performed by: THORACIC SURGERY (CARDIOTHORACIC VASCULAR SURGERY)

## 2025-06-03 PROCEDURE — 99999 PR PBB SHADOW E&M-EST. PATIENT-LVL IV: CPT | Mod: PBBFAC,,, | Performed by: THORACIC SURGERY (CARDIOTHORACIC VASCULAR SURGERY)

## (undated) DEVICE — SPIKE SHORT LG BORE 1-WAY 2IN

## (undated) DEVICE — GUIDE LAUNCHER 6FR EBU 3.0

## (undated) DEVICE — CATH FL 3.5 5FR

## (undated) DEVICE — KIT COPILOT VALVE HEMO TOOL

## (undated) DEVICE — CATH NC EMERGE MR 3X12MM

## (undated) DEVICE — INFLATOR ENCORE 26 BLLN INFL

## (undated) DEVICE — OMNIPAQUE 350 200ML

## (undated) DEVICE — HEMOSTAT VASC BAND REG 24CM

## (undated) DEVICE — DRAPE ANGIO BRACH 38X44IN

## (undated) DEVICE — KIT CUSTOM MANIFOLD

## (undated) DEVICE — KIT GLIDESHEATH SLEND 6FR 10CM

## (undated) DEVICE — GUIDEWIRE EMERALD .035IN 260CM

## (undated) DEVICE — CATH EMERGE MR 12 X 2.50

## (undated) DEVICE — GUIDE WIRE BMW 014 X190

## (undated) DEVICE — TRAY CATH LAB OMC

## (undated) DEVICE — PAD DEFIB CADENCE ADULT R2

## (undated) DEVICE — CATH EAGLE EYE PLATINUM

## (undated) DEVICE — CATH JACKY RADIAL 5FR 100CM

## (undated) DEVICE — TRANSDUCER ADULT DISP